# Patient Record
Sex: MALE | Race: WHITE | HISPANIC OR LATINO | URBAN - METROPOLITAN AREA
[De-identification: names, ages, dates, MRNs, and addresses within clinical notes are randomized per-mention and may not be internally consistent; named-entity substitution may affect disease eponyms.]

---

## 2022-10-28 ENCOUNTER — APPOINTMENT (OUTPATIENT)
Dept: LAB | Facility: CLINIC | Age: 27
End: 2022-10-28

## 2022-10-28 DIAGNOSIS — N62 GYNECOMASTIA: ICD-10-CM

## 2022-10-28 LAB
ALBUMIN SERPL BCP-MCNC: 4 G/DL (ref 3.5–5)
ALP SERPL-CCNC: 62 U/L (ref 46–116)
ALT SERPL W P-5'-P-CCNC: 32 U/L (ref 12–78)
ANION GAP SERPL CALCULATED.3IONS-SCNC: 4 MMOL/L (ref 4–13)
AST SERPL W P-5'-P-CCNC: 29 U/L (ref 5–45)
B-HCG SERPL-ACNC: <2 MIU/ML
BILIRUB SERPL-MCNC: 0.87 MG/DL (ref 0.2–1)
BUN SERPL-MCNC: 15 MG/DL (ref 5–25)
CALCIUM SERPL-MCNC: 9.7 MG/DL (ref 8.3–10.1)
CHLORIDE SERPL-SCNC: 103 MMOL/L (ref 96–108)
CO2 SERPL-SCNC: 29 MMOL/L (ref 21–32)
CREAT SERPL-MCNC: 1.12 MG/DL (ref 0.6–1.3)
ESTRADIOL SERPL-MCNC: 28 PG/ML (ref 11–52.5)
FSH SERPL-ACNC: 3 MIU/ML (ref 0.7–10.8)
GFR SERPL CREATININE-BSD FRML MDRD: 90 ML/MIN/1.73SQ M
GLUCOSE P FAST SERPL-MCNC: 78 MG/DL (ref 65–99)
LH SERPL-ACNC: 4.2 MIU/ML (ref 1.2–10.6)
POTASSIUM SERPL-SCNC: 4.2 MMOL/L (ref 3.5–5.3)
PROLACTIN SERPL-MCNC: 5.4 NG/ML (ref 2.5–17.4)
PROT SERPL-MCNC: 8.1 G/DL (ref 6.4–8.4)
SODIUM SERPL-SCNC: 136 MMOL/L (ref 135–147)
TESTOST SERPL-MCNC: 482 NG/DL (ref 113–1065)
TSH SERPL DL<=0.05 MIU/L-ACNC: 4.08 UIU/ML (ref 0.45–4.5)

## 2022-10-28 PROCEDURE — 84443 ASSAY THYROID STIM HORMONE: CPT

## 2022-10-28 PROCEDURE — 84702 CHORIONIC GONADOTROPIN TEST: CPT

## 2022-10-28 PROCEDURE — 84146 ASSAY OF PROLACTIN: CPT

## 2022-10-28 PROCEDURE — 36415 COLL VENOUS BLD VENIPUNCTURE: CPT

## 2022-10-28 PROCEDURE — 80053 COMPREHEN METABOLIC PANEL: CPT

## 2022-10-28 PROCEDURE — 83001 ASSAY OF GONADOTROPIN (FSH): CPT

## 2022-10-28 PROCEDURE — 82670 ASSAY OF TOTAL ESTRADIOL: CPT

## 2022-10-28 PROCEDURE — 83002 ASSAY OF GONADOTROPIN (LH): CPT

## 2022-10-28 PROCEDURE — 84403 ASSAY OF TOTAL TESTOSTERONE: CPT

## 2023-05-06 ENCOUNTER — HOSPITAL ENCOUNTER (EMERGENCY)
Facility: HOSPITAL | Age: 28
Discharge: HOME | End: 2023-05-06
Attending: EMERGENCY MEDICINE

## 2023-05-06 VITALS
OXYGEN SATURATION: 99 % | TEMPERATURE: 97.3 F | WEIGHT: 205.47 LBS | SYSTOLIC BLOOD PRESSURE: 149 MMHG | HEART RATE: 90 BPM | RESPIRATION RATE: 14 BRPM | DIASTOLIC BLOOD PRESSURE: 84 MMHG

## 2023-05-06 DIAGNOSIS — T14.8XXA ABRASION: ICD-10-CM
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
## 2023-05-06 RX ADMIN — TETANUS TOXOID, REDUCED DIPHTHERIA TOXOID AND ACELLULAR PERTUSSIS VACCINE, ADSORBED 0.5 ML: 5; 2.5; 8; 8; 2.5 SUSPENSION INTRAMUSCULAR at 09:46
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
## 2024-06-19 ENCOUNTER — TELEPHONE (OUTPATIENT)
Age: 29
End: 2024-06-19

## 2024-06-19 NOTE — TELEPHONE ENCOUNTER
NP calling to schedule vasectomy consult. Pt will call insurance company first to see if they cover procedure and will call back to schedule.

## 2024-08-15 ENCOUNTER — OFFICE VISIT (OUTPATIENT)
Dept: FAMILY MEDICINE CLINIC | Facility: CLINIC | Age: 29
End: 2024-08-15

## 2024-08-15 VITALS
WEIGHT: 194 LBS | HEART RATE: 83 BPM | BODY MASS INDEX: 29.4 KG/M2 | SYSTOLIC BLOOD PRESSURE: 109 MMHG | HEIGHT: 68 IN | OXYGEN SATURATION: 97 % | RESPIRATION RATE: 18 BRPM | DIASTOLIC BLOOD PRESSURE: 70 MMHG | TEMPERATURE: 98.7 F

## 2024-08-15 DIAGNOSIS — Z30.09 SCREENING AND EVALUATION FOR VASECTOMY: Primary | ICD-10-CM

## 2024-08-15 DIAGNOSIS — N62 GYNECOMASTIA: ICD-10-CM

## 2024-08-15 PROCEDURE — 99204 OFFICE O/P NEW MOD 45 MIN: CPT | Performed by: FAMILY MEDICINE

## 2024-08-15 NOTE — PROGRESS NOTES
Ambulatory Visit  Name: Juan Griffin      : 1995      MRN: [id number]  Encounter Provider: Primitivo Bowles MD  Encounter Date: 8/15/2024   Encounter department: [facility]    Assessment & Plan   1. Screening and evaluation for vasectomy  Assessment & Plan:  Has two kids.   Patient and partner don't want more children.     Plan:   Referring to Urologist for evaluation.     Orders:  -     Ambulatory Referral to Urology; Future  2. Gynecomastia  Comments:  Encouraged to see Endocrinologist prior to seeing Plastic Surgery.  Assessment & Plan:  Painful bilaterally.   Tender to touch.   Uncle with same issue.   Bothersome to patient.   Work up in  wnl.   Affecting confidence, feels breast continue to enlarge.     Plan:   Referred to plastic surgery-patient adamant that he wants breast reduction.     Orders:  -     Ambulatory Referral to Plastic Surgery; Future  -     Ambulatory Referral to Endocrinology; Future       History of Present Illness     Patient here for evaluation for vasectomy and gynecomastia. Gynecomastia with tenderness for two years. Work up in  unremarkable. Seen Endocrinology in [place]. Patient adamant that he wants a vasectomy and surgery to reduce his breast size. Not on any medications. No other concerns.         Review of Systems   Constitutional:  Negative for chills and fever.   HENT:  Negative for ear pain and sore throat.    Eyes:  Negative for pain and visual disturbance.   Respiratory:  Negative for cough and shortness of breath.    Cardiovascular:  Negative for chest pain and palpitations.   Gastrointestinal:  Negative for abdominal pain and vomiting.   Genitourinary:  Negative for dysuria and hematuria.   Musculoskeletal:  Negative for arthralgias and back pain.        Breast pain bilaterally.    Skin:  Negative for color change and rash.   Neurological:  Negative for seizures and syncope.   All other systems reviewed and  "are negative.    Pertinent Medical History         Medical History Reviewed by provider this encounter:  Meds  Problems       No current outpatient medications on file prior to visit.     No current facility-administered medications on file prior to visit.      Social History     Tobacco Use    Smoking status: Never     Passive exposure: Never    Smokeless tobacco: Never   Vaping Use    Vaping status: Never Used   Substance and Sexual Activity    Alcohol use: Never    Drug use: Never    Sexual activity: Not on file     Objective     /70 (BP Location: Right arm, Patient Position: Sitting, Cuff Size: Large)   Pulse 83   Temp 98.7 °F (37.1 °C) (Temporal)   Resp 18   Ht 5' 7.5\" (1.715 m)   Wt 88 kg (194 lb)   SpO2 97%   BMI 29.94 kg/m²     Physical Exam  Vitals and nursing note reviewed.   Constitutional:       General: He is not in acute distress.     Appearance: He is well-developed.   HENT:      Head: Normocephalic and atraumatic.   Eyes:      Conjunctiva/sclera: Conjunctivae normal.   Cardiovascular:      Rate and Rhythm: Normal rate and regular rhythm.      Heart sounds: No murmur heard.  Pulmonary:      Effort: Pulmonary effort is normal. No respiratory distress.      Breath sounds: Normal breath sounds.   Abdominal:      Palpations: Abdomen is soft.      Tenderness: There is no abdominal tenderness.   Musculoskeletal:         General: Tenderness present. No swelling.      Cervical back: Neck supple.      Comments: Bilateral tenderness in nipples.    Skin:     General: Skin is warm and dry.      Capillary Refill: Capillary refill takes less than 2 seconds.   Neurological:      Mental Status: He is alert.   Psychiatric:         Mood and Affect: Mood normal.       Administrative Statements   I have spent a total time of 40 minutes in caring for this patient on the day of the visit/encounter including Diagnostic results, Risks and benefits of tx options, Instructions for management, Patient and family " education, Importance of tx compliance, Risk factor reductions, Counseling / Coordination of care, Documenting in the medical record, and Obtaining or reviewing history  .

## 2024-08-15 NOTE — ASSESSMENT & PLAN NOTE
Has two kids.   Patient and partner don't want more children.     Plan:   Referring to Urologist for evaluation.

## 2024-08-15 NOTE — ASSESSMENT & PLAN NOTE
Painful bilaterally.   Tender to touch.   Uncle with same issue.   Bothersome to patient.   Work up in 2022 wnl.   Affecting confidence, feels breast continue to enlarge.     Plan:   Referred to plastic surgery-patient adamant that he wants breast reduction.

## 2024-09-26 ENCOUNTER — COSMETIC (OUTPATIENT)
Dept: PLASTIC SURGERY | Facility: CLINIC | Age: 29
End: 2024-09-26

## 2024-09-26 VITALS
HEIGHT: 68 IN | HEART RATE: 83 BPM | DIASTOLIC BLOOD PRESSURE: 84 MMHG | TEMPERATURE: 97.8 F | BODY MASS INDEX: 28.79 KG/M2 | WEIGHT: 190 LBS | SYSTOLIC BLOOD PRESSURE: 137 MMHG

## 2024-09-26 DIAGNOSIS — Z41.1 ENCOUNTER FOR COSMETIC SURGERY: Primary | ICD-10-CM

## 2024-09-26 PROCEDURE — COSCON COSMETIC CONSULTATION: Performed by: STUDENT IN AN ORGANIZED HEALTH CARE EDUCATION/TRAINING PROGRAM

## 2024-09-27 NOTE — PROGRESS NOTES
Plastic Surgery Consult    Reason for visit: presents for gynecomastia treatment    HPI from 9/26/24  Patient is a pleasant 27 y/o male who presents with gynecomastia with his wife who acted as . He has history of gynecomastia which started 2-3 years ago and has remained stable for over a year. He has a history of steroid use as a teenager but nothing since. He denies any other medications, he denies marijuana usage.    He had endocrine consult with normal hormone levels, including thyroid.    He is otherwise healthy.    ROS: 12 pt ROS negative, except as otherwise noted in HPI    PMH: none  FamHx: non-contrib  SurgHx: right shoulder surgery  SocHx: no tobacco, no smoking, no ETOH, no marijuana  Meds: no blood thinners, no current steroids  Allergies   Allergen Reactions    Shellfish-Derived Products - Food Allergy Anaphylaxis         PE:    Vitals:    09/26/24 1435   BP: 137/84   Pulse: 83   Temp: 97.8 °F (36.6 °C)       General: NC/AT, breathing comfortably on RA  Neuro: CN II-XII grossly intact, symmetric reflexes  HEENT: PERRLA, EOMI, external ears normal, no lesions or deformities, neck supple, trachea midline  Respiratory: CTAB, normal respiratory effort  Cardio: RRR, normal S1, S2, no murmur, rubs, gallops  GI: soft, non-tender, non-distended  Extremities/MSK: normal alignment, mobility, gait, no edema  Skin: no rashes, lesions, subcutaneous nodules    BMI: 29.3    Bilateral moderate gynecomastia with excess skin  Palpable breast tissue in subareolar region  Normal nipple sensation    Labs reviewed    A/P: 27 y/o male with grade III gynecomastia with moderate tissue and excess skin.  -I discussed with patient and wife usage of liposuction and direction excision. The excision portion would be performed via large IMF incisions and replacement of the nipple-areolar complex as a skin graft. I discussed that due to the patient's excess skin, a hannah-areolar approach to excision will not be sufficient to  reduce the skin excess. This can better be controlled via IMF incision with free nipple graft. With free nipple graft, there is loss of sensation and occasionally partial necrosis, hyper/hypopigmentation changes. Patient acknowledged.  -Discussed procedure in detail as well as postoperative care with compression and drains. Patient acknowledged.  -All questions answered, concerns addressed.  -Will email dawit Alexis MD   [facility]   [address] [place] [address], [address]   [place] [address]   Office: [phone]

## 2024-09-30 ENCOUNTER — OFFICE VISIT (OUTPATIENT)
Dept: UROLOGY | Facility: MEDICAL CENTER | Age: 29
End: 2024-09-30
Payer: COMMERCIAL

## 2024-09-30 VITALS
HEIGHT: 68 IN | HEART RATE: 71 BPM | WEIGHT: 193 LBS | OXYGEN SATURATION: 97 % | BODY MASS INDEX: 29.25 KG/M2 | SYSTOLIC BLOOD PRESSURE: 104 MMHG | DIASTOLIC BLOOD PRESSURE: 66 MMHG

## 2024-09-30 DIAGNOSIS — Z30.09 SCREENING AND EVALUATION FOR VASECTOMY: ICD-10-CM

## 2024-09-30 PROCEDURE — 99203 OFFICE O/P NEW LOW 30 MIN: CPT | Performed by: UROLOGY

## 2024-09-30 RX ORDER — ALPRAZOLAM 1 MG
TABLET ORAL
Qty: 1 TABLET | Refills: 0 | Status: SHIPPED | OUTPATIENT
Start: 2024-09-30

## 2024-09-30 NOTE — PROGRESS NOTES
"   HISTORY:        This patient has 2 children and would like to have a vasectomy.  He and his wife or partner are sure they want no more children, and are aware that vasectomy is intended to be a permanent form of sterilization.    He has been told and understands the following:         We will order a semen analysis to check for sterility after two months, and that he must use protection during that time.         No guarantee can be made of permanent sterility, and that failure of the procedure is not common, but can occur.  Furthermore, spontaneous reestablishment of the flow sperm is quite rare but is a possible reason for failure of the procedure.           Although it is not mandatory, if he wants to request another semen sample at any time in the future, to ensure continued sterility, he can do so, at his decision.         Potential complications of the procedure include, but are not limited to:  Excessive bleeding which can cause significant swelling; infections; chronic lingering pain of the testicle; damage to the blood supply of the testicle, which might lead to testicular atrophy.         The patient has told me he understands the above statements, and wishes to proceed with scheduling the vasectomy.    The following portions of the patient's history were reviewed and updated as appropriate: allergies, current medications, past family history, past medical history, past social history, past surgical history, and problem list.    Review of Systems      Objective:     Physical Exam  Genitourinary:     Comments: Penis and testes normal, vasa easily palpable          No results found for: \"PSA\"]  BUN   Date Value Ref Range Status   10/28/2022 15 5 - 25 mg/dL Final     Creatinine   Date Value Ref Range Status   10/28/2022 1.12 0.60 - 1.30 mg/dL Final     Comment:     Standardized to IDMS reference method     No components found for: \"CBC\"      Patient Active Problem List   Diagnosis    Screening and " evaluation for vasectomy    Gynecomastia        Diagnoses and all orders for this visit:    Screening and evaluation for vasectomy  -     Ambulatory Referral to Urology  -     ALPRAZolam (XANAX) 1 mg tablet; 1-2 hr before procedure           Patient ID: Juan Griffin is a 28 y.o. male.    No current outpatient medications on file.    History reviewed. No pertinent past medical history.    Past Surgical History:   Procedure Laterality Date    EYE SURGERY Left     SHOULDER SURGERY Right        Social History